# Patient Record
Sex: FEMALE | Race: WHITE | NOT HISPANIC OR LATINO | URBAN - METROPOLITAN AREA
[De-identification: names, ages, dates, MRNs, and addresses within clinical notes are randomized per-mention and may not be internally consistent; named-entity substitution may affect disease eponyms.]

---

## 2020-11-16 ENCOUNTER — EMERGENCY (EMERGENCY)
Facility: HOSPITAL | Age: 85
LOS: 1 days | Discharge: HOME | End: 2020-11-16
Attending: STUDENT IN AN ORGANIZED HEALTH CARE EDUCATION/TRAINING PROGRAM | Admitting: STUDENT IN AN ORGANIZED HEALTH CARE EDUCATION/TRAINING PROGRAM
Payer: MEDICARE

## 2020-11-16 VITALS
HEART RATE: 70 BPM | DIASTOLIC BLOOD PRESSURE: 87 MMHG | OXYGEN SATURATION: 97 % | SYSTOLIC BLOOD PRESSURE: 116 MMHG | TEMPERATURE: 98 F | RESPIRATION RATE: 17 BRPM

## 2020-11-16 VITALS
SYSTOLIC BLOOD PRESSURE: 138 MMHG | DIASTOLIC BLOOD PRESSURE: 83 MMHG | TEMPERATURE: 98 F | HEIGHT: 66 IN | OXYGEN SATURATION: 97 % | HEART RATE: 74 BPM | WEIGHT: 169.98 LBS | RESPIRATION RATE: 16 BRPM

## 2020-11-16 PROCEDURE — 73100 X-RAY EXAM OF WRIST: CPT | Mod: 26,52,59,77

## 2020-11-16 PROCEDURE — 73110 X-RAY EXAM OF WRIST: CPT | Mod: 26,LT

## 2020-11-16 PROCEDURE — 73100 X-RAY EXAM OF WRIST: CPT

## 2020-11-16 PROCEDURE — 99285 EMERGENCY DEPT VISIT HI MDM: CPT | Mod: 25

## 2020-11-16 PROCEDURE — 73090 X-RAY EXAM OF FOREARM: CPT

## 2020-11-16 PROCEDURE — 73110 X-RAY EXAM OF WRIST: CPT

## 2020-11-16 PROCEDURE — 73100 X-RAY EXAM OF WRIST: CPT | Mod: 26,59,77,LT

## 2020-11-16 PROCEDURE — 25600 CLTX DST RDL FX/EPHYS SEP WO: CPT | Mod: LT

## 2020-11-16 PROCEDURE — 73080 X-RAY EXAM OF ELBOW: CPT

## 2020-11-16 PROCEDURE — 73090 X-RAY EXAM OF FOREARM: CPT | Mod: 26,LT,77

## 2020-11-16 PROCEDURE — 73090 X-RAY EXAM OF FOREARM: CPT | Mod: 26,LT

## 2020-11-16 PROCEDURE — 73080 X-RAY EXAM OF ELBOW: CPT | Mod: 26,LT

## 2020-11-16 PROCEDURE — 99284 EMERGENCY DEPT VISIT MOD MDM: CPT | Mod: 25

## 2020-11-16 NOTE — ED PROVIDER NOTE - PATIENT PORTAL LINK FT
You can access the FollowMyHealth Patient Portal offered by [facility] by registering at the following website: [email or web address]. By joining [facility]’s FollowMyHealth portal, you will also be able to view your health information using other applications (apps) compatible with our system.

## 2020-11-16 NOTE — ED PROVIDER NOTE - PHYSICAL EXAMINATION
Constitutional: Awake, Alert, non-toxic. NAD. Well appearing, well nourished.   HEAD: Normocephalic, atraumatic.   EYES: EOM intact, conjunctiva and sclera are clear bilaterally.   ENT: No rhinorrhea, patent, mucous membranes pink/moist, no drooling or stridor.   NECK: Supple, non-tender  RESPIRATORY: Normal respiratory effort  EXTREMITIES: Full passive and active ROM in all extremities; (+) left wrist swelling and deformity, (+) left wrist TTP, no hand or elbow TTP, ROM intact, soft compartments, radial pulse intact, cap refill intact; distal pulses palpable and symmetric  SKIN: Warm, dry; good skin turgor, no apparent lesions or rashes, no ecchymosis, brisk capillary refill.  NEURO: A&O x3. Sensory and motor functions are grossly intact. Speech is normal. Appearance and judgement seem appropriate for gender and age.

## 2020-11-16 NOTE — ED PROVIDER NOTE - CARE PROVIDER_API CALL
Mk Greer)  Orthopaedic Surgery  [address] [place] [address]  Phone: [phone]  Fax: [phone]  Follow Up Time: 4-6 Days

## 2020-11-16 NOTE — ED PROVIDER NOTE - CLINICAL SUMMARY MEDICAL DECISION MAKING FREE TEXT BOX
sent by urgent care due to fall. pt reports she was outside picking something up when she fell forward on to her hands. pt reports she went to urgent care in which she fractured her wrist and was advised to come to ED. Plan includes x-ray, re-assess. pt declined pain meds.

## 2020-11-16 NOTE — ED PROVIDER NOTE - OBJECTIVE STATEMENT
83 y/o female with PMHx HTN and HLD sent by urgent care due to fall. pt reports she was outside picking something up when she fell forward on to her hands. pt reports she went to urgent care in which she fractured her wrist and was advised to come to ED. pt reports she fell around 7 am and took tylenol 1000mg. pt describes pain as aching, worse with movement, and currently 5/10. pt denies prodromal symptoms, dizziness, chest pain, SOB, head injury, headache, numbness/weakness, open wounds, numbness/ weakness, or any other complaints.

## 2020-11-16 NOTE — ED PROVIDER NOTE - ATTENDING CONTRIBUTION TO CARE
85yo F with mechanical fall on her porch, landing on left arm, no head injury, no loc, no neck or back pain. + deformity of left wrist, + pulses intact, and sesnation intact  likely radial fx,   xrays, ortho

## 2020-11-16 NOTE — ED PROVIDER NOTE - PROGRESS NOTE DETAILS
ortho reduced fx. Advised patient may be dced with follow up within the week with Dr. Greer. ER return precautions discussed

## 2020-11-16 NOTE — ED ADULT NURSE NOTE - OBJECTIVE STATEMENT
Received pt alert and orientated pt denies pain pt was going to  newspaper and fell on her left side deformity on left wrist positive pulse on left wrist. pt denies SOB abd pain and chest pain. Pt denies hitting her head. Pt denies LOC.  Pt is able to move all her extremities. Pt is able to stand. Will continue to monitor. Freq rounding performed. Call bell within reach.

## 2020-12-02 NOTE — CHART NOTE - NSCHARTNOTEFT_GEN_A_CORE
Patient is an 86 y/o F RHD F who was seen and evaluated at bedside on 11/16/20 by Jessa Georges and Annette from orthopaedic surgery.    Patient came to the ED c/o of severe left wrist pain after a mechanical fall. Patient was denying any numbness, tingling or burning. Patient denied any other injuries.     PE:  Vitals were stable    PE L UE:  Skin intact, visible deformity of wrist, + soft tissue swelling, no ecchymosis; Decreased ROM of Wrist 2/2 pain. Normal Elbow/Shoulder ROM w/o pain. + TTP over DR/Ulna. + Rad Pulse 2+/4. SILT C5-T1, +AIN/PIN/Ulnar/Radial/Musc/Median, soft compartments;    SECONDARY EXAM: Benign, Skin intact, SILT throughout, motor grossly intact throughout, no other orthopedic injuries at this time, compartments soft and compressible    SPINE: Skin intact, no bony tenderness or step-offs appreciated throughout cervical/thoracic/lumbar/sacral spine    R UE: Skin intact, no erythema, ecchymosis, edema, gross deformity, NTTP over the bony prominences of the shoulder/elbow/wrist/hand, painless passive/active ROM of the shoulder/elbow/wrist/hand, C5-T1 SILT, motor grossly intact throughout axillary/musculocutaenous/radial/median/ulnar nerves, + radial pulse    B/L LE: Skin intact, no erythema, ecchymosis, edema, gross deformity, NTTP over the bony prominences of the hip/knee/ankle/foot, painless passive/active ROM of the hip/knee/ankle/foot, L2-S1 SILT, motor grossly intact throughout hip flexors/quads/hams/TA/EHL/FHL/GSC, + DP/PT pulses, no pain with log roll, no pain on axial loading, compartments soft and compressible, calves nontender    Imaging:  XRay L Wrist  3 views of L Wrist demonstrates L distal radius fracture, extra-articular. No other fx/dislocations noted.     Procedure Note:  After verbal consent obtained, ~ 10 cc of 1% Lidocaine injected into area around DR/Ulna as hematoma block. UE hung by fingers and reduction maneuver performed. Sugartong splint applied to Forearm/Wrist and mold held. NV XR obtained which show improved alignment of R/L DR Fracture. Pt NVI post procedure. Pt tolerated procedure well.    Patent was an 86 y/o F with a distal radius fracture and was recommended the following:  - Pain control  - Strict Ice/Elevation  - NWB L UE with splint and sling  - Keep splint clean/dry/intact;  - Encourage active finger motion to help with swelling  - Pt aware of possible need for surgical intervention of distal radius fracture.  - Pt made aware of signs and symptoms of compartment syndrome. Aware of need to contact Doctor / Return to ED if symptoms arise  - Patient verablized understanding of the plan  - FU w/ Dr. Greer in  7-10 days.     Case was d/w Dr. Greer who agreed with planed Patient is an 86 y/o F RHD F who was seen and evaluated at bedside on 11/16/20 by Jessa Georges and Annette from orthopaedic surgery.    Patient came to the ED c/o of severe left wrist pain after a mechanical fall. Patient was denying any numbness, tingling or burning. Patient denied any other injuries.     PE:  Vitals were stable    PE L UE:  Skin intact, visible deformity of wrist, + soft tissue swelling, no ecchymosis; Decreased ROM of Wrist 2/2 pain. Normal Elbow/Shoulder ROM w/o pain. + TTP over DR/Ulna. + Rad Pulse 2+/4. SILT C5-T1, +AIN/PIN/Ulnar/Radial/Musc/Median, soft compartments;    SECONDARY EXAM: Benign, Skin intact, SILT throughout, motor grossly intact throughout, no other orthopedic injuries at this time, compartments soft and compressible    SPINE: Skin intact, no bony tenderness or step-offs appreciated throughout cervical/thoracic/lumbar/sacral spine    R UE: Skin intact, no erythema, ecchymosis, edema, gross deformity, NTTP over the bony prominences of the shoulder/elbow/wrist/hand, painless passive/active ROM of the shoulder/elbow/wrist/hand, C5-T1 SILT, motor grossly intact throughout axillary/musculocutaenous/radial/median/ulnar nerves, + radial pulse    B/L LE: Skin intact, no erythema, ecchymosis, edema, gross deformity, NTTP over the bony prominences of the hip/knee/ankle/foot, painless passive/active ROM of the hip/knee/ankle/foot, L2-S1 SILT, motor grossly intact throughout hip flexors/quads/hams/TA/EHL/FHL/GSC, + DP/PT pulses, no pain with log roll, no pain on axial loading, compartments soft and compressible, calves nontender    Imaging:  XRay L Wrist  3 views of L Wrist demonstrates L distal radius fracture, extra-articular. No other fx/dislocations noted.     Procedure Note:  After verbal consent obtained, ~ 10 cc of 1% Lidocaine injected into area around DR/Ulna as hematoma block. UE hung by fingers and reduction maneuver performed. Well padded sugartong splint applied to Forearm/Wrist and mold held. MS XR obtained which show improved alignment of R/L DR Fracture. Pt NVI post procedure. Pt tolerated procedure well.    Patent was an 86 y/o F with a distal radius fracture and was recommended the following:  - Pain control  - Strict Ice/Elevation  - NWB L UE with splint and sling  - Keep splint clean/dry/intact;  - Encourage active finger motion to help with swelling  - Pt aware of possible need for surgical intervention of distal radius fracture.  - Pt made aware of signs and symptoms of compartment syndrome. Aware of need to contact Doctor / Return to ED if symptoms arise  - Patient verablized understanding of the plan  - FU w/ Dr. Greer in  7-10 days.     Case was d/w Dr. Greer who agreed with planed

## 2020-12-02 NOTE — CHART NOTE - NSCHARTNOTEFT_GEN_A_CORE
Patient called on 12/2 for follow up. Patient doing well and has been following with Dr. Irizarry from [facility]. Patient has been doing well and will continue to follow with Edie.     Lobo Bryant, DO   PGY-2 Orthopaedic Surgery Patient called on 12/2 for follow up. Patient doing well and has been following with Dr. Mistry from [facility]. Patient has been doing well and will continue to follow with Fidelia.     Lobo Bryant, DO   PGY-2 Orthopaedic Surgery Patient called on 12/2 for follow up. Patient doing well and has been following with Dr. Mistry from [facility]. Sugartong has been removed and patient was placed in cast. Patient to continue to follow with Fidelia.     Lobo Bryant, DO   PGY-2 Orthopaedic Surgery

## 2021-08-23 NOTE — ED ADULT NURSE NOTE - NSFALLRSKHARMRISK_ED_ALL_ED
Please call the [facility] at [phone], select OPTION #2,  if any of your medications change.  This means: if a med is discontinued, a new med is started, or a dose is changed.  These meds may interact with your warfarin and we may need to adjust your dose.  This is especially important if you start any type of ANTIBIOTIC.    Also, please call if you have any admissions to the hospital, visits to an emergency room, procedures planned, or if any other medical provider has instructed you to hold your warfarin.     yes

## 2023-03-28 ENCOUNTER — OFFICE (OUTPATIENT)
Dept: URBAN - METROPOLITAN AREA CLINIC 34 | Facility: CLINIC | Age: 88
Setting detail: OPHTHALMOLOGY
End: 2023-03-28
Payer: MEDICARE

## 2023-03-28 DIAGNOSIS — Z96.1: ICD-10-CM

## 2023-03-28 DIAGNOSIS — H16.221: ICD-10-CM

## 2023-03-28 DIAGNOSIS — H11.152: ICD-10-CM

## 2023-03-28 DIAGNOSIS — H35.3112: ICD-10-CM

## 2023-03-28 DIAGNOSIS — H26.493: ICD-10-CM

## 2023-03-28 DIAGNOSIS — H00.025: ICD-10-CM

## 2023-03-28 DIAGNOSIS — H00.022: ICD-10-CM

## 2023-03-28 PROCEDURE — 99213 OFFICE O/P EST LOW 20 MIN: CPT | Performed by: OPHTHALMOLOGY

## 2023-03-28 ASSESSMENT — REFRACTION_CURRENTRX
OS_VPRISM_DIRECTION: SV
OS_CYLINDER: +1.75
OD_VPRISM_DIRECTION: SV
OS_OVR_VA: 20/
OD_SPHERE: +0.75
OD_OVR_VA: 20/
OS_VPRISM_DIRECTION: SV
OD_SPHERE: +4.00
OS_OVR_VA: 20/
OS_SPHERE: +4.00
OD_CYLINDER: +2.50
OS_VPRISM_DIRECTION: SV
OD_OVR_VA: 20/
OD_SPHERE: +4.50
OD_AXIS: 176
OS_OVR_VA: 20/
OD_VPRISM_DIRECTION: SV
OS_AXIS: 179
OD_OVR_VA: 20/
OS_SPHERE: +4.00
OD_VPRISM_DIRECTION: SV
OS_SPHERE: +0.25

## 2023-03-28 ASSESSMENT — KERATOMETRY
OD_AXISANGLE_DEGREES: 171
METHOD_AUTO_MANUAL: AUTO
OS_K2POWER_DIOPTERS: 46.50
OS_AXISANGLE_DEGREES: 089
OS_K1POWER_DIOPTERS: 43.25
OD_K2POWER_DIOPTERS: 44.25
OD_K1POWER_DIOPTERS: 43.00

## 2023-03-28 ASSESSMENT — REFRACTION_MANIFEST
OD_AXIS: 174
OD_AXIS: 180
OD_VA1: 20/80
OD_CYLINDER: +0.75
OD_AXIS: 160
OD_SPHERE: -0.50
OD_SPHERE: -0.25
OD_VA1: 20/50-2
OD_SPHERE: -0.75
OD_CYLINDER: +0.50
OD_VA1: 20/70
OD_CYLINDER: +0.50

## 2023-03-28 ASSESSMENT — SPHEQUIV_DERIVED
OD_SPHEQUIV: -0.375
OS_SPHEQUIV: -5.875
OD_SPHEQUIV: -0.75
OD_SPHEQUIV: 0
OD_SPHEQUIV: -0.25

## 2023-03-28 ASSESSMENT — REFRACTION_AUTOREFRACTION
OS_SPHERE: -6.75
OS_AXIS: 128
OD_SPHERE: -1.25
OD_CYLINDER: +1.00
OS_CYLINDER: +1.75
OD_AXIS: 149

## 2023-03-28 ASSESSMENT — AXIALLENGTH_DERIVED
OD_AL: 23.8408
OD_AL: 23.6435
OD_AL: 23.6925
OD_AL: 23.5461
OS_AL: 25.5217

## 2023-03-28 ASSESSMENT — TONOMETRY
OD_IOP_MMHG: 16
OS_IOP_MMHG: 18

## 2023-03-28 ASSESSMENT — VISUAL ACUITY
OD_BCVA: 20/HM
OS_BCVA: 20/60+2

## 2023-03-28 ASSESSMENT — CONFRONTATIONAL VISUAL FIELD TEST (CVF)
OS_FINDINGS: FULL
OD_FINDINGS: FULL

## 2023-05-19 ENCOUNTER — OFFICE (OUTPATIENT)
Dept: URBAN - METROPOLITAN AREA CLINIC 35 | Facility: CLINIC | Age: 88
Setting detail: OPHTHALMOLOGY
End: 2023-05-19
Payer: MEDICARE

## 2023-05-19 DIAGNOSIS — H26.492: ICD-10-CM

## 2023-05-19 PROCEDURE — 66821 AFTER CATARACT LASER SURGERY: CPT | Performed by: OPHTHALMOLOGY

## 2023-05-19 ASSESSMENT — REFRACTION_CURRENTRX
OS_AXIS: 179
OS_VPRISM_DIRECTION: SV
OS_CYLINDER: +1.75
OS_OVR_VA: 20/
OD_VPRISM_DIRECTION: SV
OD_AXIS: 176
OD_SPHERE: +0.75
OD_VPRISM_DIRECTION: SV
OD_VPRISM_DIRECTION: SV
OS_VPRISM_DIRECTION: SV
OS_OVR_VA: 20/
OD_SPHERE: +4.00
OD_OVR_VA: 20/
OD_CYLINDER: +2.50
OS_SPHERE: +4.00
OD_SPHERE: +4.50
OD_OVR_VA: 20/
OS_VPRISM_DIRECTION: SV
OS_SPHERE: +4.00
OS_OVR_VA: 20/
OS_SPHERE: +0.25
OD_OVR_VA: 20/

## 2023-05-19 ASSESSMENT — SPHEQUIV_DERIVED
OD_SPHEQUIV: -0.375
OD_SPHEQUIV: -0.25
OD_SPHEQUIV: 0

## 2023-05-19 ASSESSMENT — VISUAL ACUITY
OD_BCVA: 20/HM
OS_BCVA: 20/60+2

## 2023-05-19 ASSESSMENT — REFRACTION_MANIFEST
OD_AXIS: 160
OD_SPHERE: -0.25
OD_AXIS: 180
OD_CYLINDER: +0.50
OD_AXIS: 174
OD_VA1: 20/50-2
OD_CYLINDER: +0.75
OD_SPHERE: -0.75
OD_CYLINDER: +0.50
OD_VA1: 20/80
OD_VA1: 20/70
OD_SPHERE: -0.50

## 2023-05-19 ASSESSMENT — KERATOMETRY: METHOD_AUTO_MANUAL: AUTO

## 2023-05-19 ASSESSMENT — CONFRONTATIONAL VISUAL FIELD TEST (CVF)
OD_FINDINGS: FULL
OS_FINDINGS: FULL

## 2023-06-23 ENCOUNTER — OFFICE (OUTPATIENT)
Dept: URBAN - METROPOLITAN AREA CLINIC 35 | Facility: CLINIC | Age: 88
Setting detail: OPHTHALMOLOGY
End: 2023-06-23
Payer: MEDICARE

## 2023-06-23 DIAGNOSIS — Z96.1: ICD-10-CM

## 2023-06-23 DIAGNOSIS — H18.529: ICD-10-CM

## 2023-06-23 DIAGNOSIS — H26.492: ICD-10-CM

## 2023-06-23 PROCEDURE — 99213 OFFICE O/P EST LOW 20 MIN: CPT | Performed by: OPHTHALMOLOGY

## 2023-06-23 ASSESSMENT — REFRACTION_MANIFEST
OD_CYLINDER: +0.75
OD_CYLINDER: +0.50
OD_CYLINDER: +0.50
OD_VA1: 20/50-2
OD_SPHERE: -0.50
OD_AXIS: 180
OD_SPHERE: -0.25
OD_SPHERE: -0.75
OD_AXIS: 174
OD_AXIS: 160
OD_VA1: 20/80
OD_VA1: 20/70

## 2023-06-23 ASSESSMENT — REFRACTION_CURRENTRX
OS_VPRISM_DIRECTION: SV
OS_OVR_VA: 20/
OD_VPRISM_DIRECTION: SV
OS_CYLINDER: +1.75
OS_OVR_VA: 20/
OS_AXIS: 179
OD_OVR_VA: 20/
OD_SPHERE: +4.00
OD_CYLINDER: +2.50
OS_VPRISM_DIRECTION: SV
OD_VPRISM_DIRECTION: SV
OD_SPHERE: +4.50
OS_VPRISM_DIRECTION: SV
OD_OVR_VA: 20/
OD_VPRISM_DIRECTION: SV
OD_SPHERE: +0.75
OD_OVR_VA: 20/
OD_AXIS: 176
OS_SPHERE: +4.00
OS_SPHERE: +0.25
OS_SPHERE: +4.00
OS_OVR_VA: 20/

## 2023-06-23 ASSESSMENT — CONFRONTATIONAL VISUAL FIELD TEST (CVF)
OD_FINDINGS: FULL
OS_FINDINGS: FULL

## 2023-06-23 ASSESSMENT — VISUAL ACUITY: OD_BCVA: 20/HM

## 2023-06-23 ASSESSMENT — TONOMETRY: OS_IOP_MMHG: 16

## 2023-06-23 ASSESSMENT — KERATOMETRY: METHOD_AUTO_MANUAL: AUTO

## 2023-06-23 ASSESSMENT — SPHEQUIV_DERIVED
OD_SPHEQUIV: 0
OD_SPHEQUIV: -0.375
OD_SPHEQUIV: -0.25

## 2023-08-11 PROBLEM — H11.153 PINGUECULA ; BOTH EYES: Status: ACTIVE | Noted: 2023-08-11

## 2024-05-28 ENCOUNTER — OFFICE (OUTPATIENT)
Dept: URBAN - METROPOLITAN AREA CLINIC 34 | Facility: CLINIC | Age: 89
Setting detail: OPHTHALMOLOGY
End: 2024-05-28
Payer: MEDICARE

## 2024-05-28 DIAGNOSIS — H33.8: ICD-10-CM

## 2024-05-28 PROBLEM — H02.135 ECTROPION-SENILE; LEFT LOWER LID: Status: ACTIVE | Noted: 2024-05-28

## 2024-05-28 PROCEDURE — 92014 COMPRE OPH EXAM EST PT 1/>: CPT | Performed by: OPHTHALMOLOGY

## 2024-05-28 ASSESSMENT — CONFRONTATIONAL VISUAL FIELD TEST (CVF)
OS_FINDINGS: FULL
OD_FINDINGS: FULL

## 2024-08-14 PROBLEM — H11.153 PINGUECULA ; BOTH EYES: Status: ACTIVE | Noted: 2024-08-14
